# Patient Record
Sex: MALE | Race: WHITE | NOT HISPANIC OR LATINO | Employment: FULL TIME | URBAN - METROPOLITAN AREA
[De-identification: names, ages, dates, MRNs, and addresses within clinical notes are randomized per-mention and may not be internally consistent; named-entity substitution may affect disease eponyms.]

---

## 2022-12-07 ENCOUNTER — HOSPITAL ENCOUNTER (EMERGENCY)
Facility: HOSPITAL | Age: 32
Discharge: HOME | End: 2022-12-07
Attending: INTERNAL MEDICINE

## 2022-12-07 VITALS
DIASTOLIC BLOOD PRESSURE: 86 MMHG | SYSTOLIC BLOOD PRESSURE: 129 MMHG | WEIGHT: 291.69 LBS | OXYGEN SATURATION: 98 % | HEART RATE: 78 BPM | RESPIRATION RATE: 14 BRPM | HEIGHT: 72 IN | BODY MASS INDEX: 39.51 KG/M2 | TEMPERATURE: 98 F

## 2022-12-07 DIAGNOSIS — K04.7 DENTAL ABSCESS: Primary | ICD-10-CM

## 2022-12-07 LAB — POCT GLUCOSE: 243 MG/DL (ref 70–110)

## 2022-12-07 PROCEDURE — 99284 EMERGENCY DEPT VISIT MOD MDM: CPT

## 2022-12-07 PROCEDURE — 25000003 PHARM REV CODE 250: Performed by: PHYSICIAN ASSISTANT

## 2022-12-07 PROCEDURE — 82962 GLUCOSE BLOOD TEST: CPT

## 2022-12-07 RX ORDER — CHLORHEXIDINE GLUCONATE ORAL RINSE 1.2 MG/ML
15 SOLUTION DENTAL 2 TIMES DAILY
Qty: 210 ML | Refills: 0 | Status: SHIPPED | OUTPATIENT
Start: 2022-12-07 | End: 2022-12-14

## 2022-12-07 RX ORDER — IBUPROFEN 800 MG/1
800 TABLET ORAL EVERY 8 HOURS PRN
Qty: 15 TABLET | Refills: 0 | Status: SHIPPED | OUTPATIENT
Start: 2022-12-07

## 2022-12-07 RX ORDER — KETOROLAC TROMETHAMINE 10 MG/1
10 TABLET, FILM COATED ORAL
Status: COMPLETED | OUTPATIENT
Start: 2022-12-07 | End: 2022-12-07

## 2022-12-07 RX ORDER — AMOXICILLIN AND CLAVULANATE POTASSIUM 875; 125 MG/1; MG/1
1 TABLET, FILM COATED ORAL 2 TIMES DAILY
Qty: 20 TABLET | Refills: 0 | Status: SHIPPED | OUTPATIENT
Start: 2022-12-07 | End: 2022-12-17

## 2022-12-07 RX ADMIN — KETOROLAC TROMETHAMINE 10 MG: 10 TABLET, FILM COATED ORAL at 11:12

## 2022-12-07 NOTE — ED PROVIDER NOTES
"Encounter Date: 12/7/2022       History     Chief Complaint   Patient presents with    Oral Swelling     Swelling to right lower gums x 4 days.  Hx of "bad tooth" to same side 1-2 months ago.  Patient also states he has been checking his blood glucose at home and it has been between 200-400.      Patient with no known pmhx presents today c/o right lower toothache and gum swelling for 4 days. He is concerned he has a dental abscess. No exacerbating or relieving factors. This has happened to him before in the past. Patient also mentions that he has been monitoring his glucose at home because he has a strong family history and suspects he may have it as well. He says his glucose has been in the 200-350 range. Patient says he is in the process of trying to get medicaid and/or private insurance. Once he does that he will get set up with a pcp for evaluation of diabetes mellitus. Denies fever, chills, nausea, vomiting, weakness, abdominal pain, vision changes, ams/confusion, chest pain, sob.     The history is provided by the patient. No  was used.   Review of patient's allergies indicates:  No Known Allergies  History reviewed. No pertinent past medical history.  History reviewed. No pertinent surgical history.  History reviewed. No pertinent family history.     Review of Systems   Constitutional:  Negative for chills and fever.   HENT:  Positive for dental problem.    Respiratory:  Negative for cough, chest tightness and shortness of breath.    Cardiovascular:  Negative for chest pain, palpitations and leg swelling.   Gastrointestinal:  Negative for abdominal pain, constipation, diarrhea, nausea and vomiting.   Endocrine:        Hyperglycemia    Genitourinary:  Negative for dysuria, flank pain and hematuria.   Musculoskeletal:  Negative for arthralgias and myalgias.   Skin:  Negative for rash.   Neurological:  Negative for syncope, light-headedness and headaches.   All other systems reviewed and are " negative.    Physical Exam     Initial Vitals [12/07/22 0937]   BP Pulse Resp Temp SpO2   136/87 75 18 98 °F (36.7 °C) 99 %      MAP       --         Physical Exam    Vitals reviewed.  Constitutional: He appears well-developed and well-nourished. He is not diaphoretic. No distress.   HENT:   Head: Normocephalic and atraumatic.   Mouth/Throat: Oropharynx is clear and moist. No oropharyngeal exudate.   Right lower gums with area of fullness adjacent to right lower molars. There is a small amount of purulent drainage from this area and I am able to manually express more when pressure is applied. This area is tender to palpation. No trismus. No facial swelling.    Eyes: Conjunctivae and EOM are normal.   Neck: Neck supple.   Normal range of motion.  Cardiovascular:  Normal rate, regular rhythm, normal heart sounds and intact distal pulses.           Pulmonary/Chest: Breath sounds normal. No respiratory distress.   Abdominal: Abdomen is soft. Bowel sounds are normal. He exhibits no distension. There is no abdominal tenderness. There is no rebound and no guarding.   Musculoskeletal:         General: No edema.      Cervical back: Normal range of motion and neck supple.     Neurological: He is alert and oriented to person, place, and time. GCS score is 15. GCS eye subscore is 4. GCS verbal subscore is 5. GCS motor subscore is 6.   Skin: Skin is warm and dry. Capillary refill takes less than 2 seconds.   Psychiatric: He has a normal mood and affect.       ED Course   Procedures  Labs Reviewed   POCT GLUCOSE - Abnormal; Notable for the following components:       Result Value    POCT Glucose 243 (*)     All other components within normal limits   POCT GLUCOSE, HAND-HELD DEVICE          Imaging Results    None          Medications   ketorolac tablet 10 mg (10 mg Oral Given 12/7/22 1113)                 ED Course as of 12/07/22 1156   Wed Dec 07, 2022   1130 POC glucose today is 240. Recommended labs and rx for metformin if  labs wnl since he will likely will not be able to get in with a pcp quickly. Patient declined; he says he does not want to do any labs today because he wants to wait until he has some sort of health coverage. He also says he was told by someone to not take metformin because it can cause prostate cancer. Recommended patient follow a strict diet. Referral sent to medicine clinic for follow up. Also advised him to f/u with a dentist. ED precautions given.  [SA]      ED Course User Index  [SA] ESTELITA Morrow                 Clinical Impression:   Final diagnoses:  [K04.7] Dental abscess (Primary)      ED Disposition Condition    Discharge Stable          ED Prescriptions       Medication Sig Dispense Start Date End Date Auth. Provider    amoxicillin-clavulanate 875-125mg (AUGMENTIN) 875-125 mg per tablet Take 1 tablet by mouth 2 (two) times daily. for 10 days 20 tablet 12/7/2022 12/17/2022 ESTELITA Morrow    chlorhexidine (PERIDEX) 0.12 % solution Use as directed 15 mLs in the mouth or throat 2 (two) times daily. Swish and spit for 30 seconds BID for 7 days. for 7 days 210 mL 12/7/2022 12/14/2022 ESTELITA Morrow    ibuprofen (ADVIL,MOTRIN) 800 MG tablet Take 1 tablet (800 mg total) by mouth every 8 (eight) hours as needed for Pain. 15 tablet 12/7/2022 -- ESTELITA Morrow          Follow-up Information       Follow up With Specialties Details Why Contact Info Additional Information    [facility] Emergency Medicine  If symptoms worsen return to ED immediately [address] [facility] [address] [phone]     [facility] Internal [facility] Internal Medicine In 2 days  [address] [facility] [address] [phone] Internal Medicine Clinic Entrance #1    Dentist of choice within 1-2 days  Schedule an appointment as soon as possible for a visit                 ESTELITA Morrow  12/07/22 2051

## 2023-04-18 ENCOUNTER — OFFICE VISIT (OUTPATIENT)
Dept: INTERNAL MEDICINE | Facility: CLINIC | Age: 33
End: 2023-04-18
Payer: COMMERCIAL

## 2023-04-18 VITALS
BODY MASS INDEX: 38.03 KG/M2 | HEART RATE: 84 BPM | HEIGHT: 72 IN | TEMPERATURE: 98 F | SYSTOLIC BLOOD PRESSURE: 124 MMHG | DIASTOLIC BLOOD PRESSURE: 79 MMHG | OXYGEN SATURATION: 98 % | WEIGHT: 280.81 LBS | RESPIRATION RATE: 19 BRPM

## 2023-04-18 DIAGNOSIS — Z23 NEED FOR PNEUMOCOCCAL VACCINATION: ICD-10-CM

## 2023-04-18 DIAGNOSIS — E11.9 TYPE 2 DIABETES MELLITUS WITHOUT COMPLICATION, WITHOUT LONG-TERM CURRENT USE OF INSULIN: Primary | ICD-10-CM

## 2023-04-18 DIAGNOSIS — E66.9 CLASS II OBESITY: Chronic | ICD-10-CM

## 2023-04-18 DIAGNOSIS — Z00.00 HEALTHCARE MAINTENANCE: ICD-10-CM

## 2023-04-18 LAB — HBA1C MFR BLD: 11.1 %

## 2023-04-18 PROCEDURE — 83036 HEMOGLOBIN GLYCOSYLATED A1C: CPT | Mod: PBBFAC

## 2023-04-18 PROCEDURE — 99214 OFFICE O/P EST MOD 30 MIN: CPT | Mod: PBBFAC

## 2023-04-18 RX ORDER — SEMAGLUTIDE 1.34 MG/ML
1 INJECTION, SOLUTION SUBCUTANEOUS
Qty: 3 ML | Refills: 11 | Status: CANCELLED | OUTPATIENT
Start: 2023-06-06 | End: 2024-06-05

## 2023-04-18 RX ORDER — SEMAGLUTIDE 0.68 MG/ML
0.25 INJECTION, SOLUTION SUBCUTANEOUS
Qty: 1.5 ML | Refills: 0 | Status: CANCELLED | OUTPATIENT
Start: 2023-04-18 | End: 2023-05-16

## 2023-04-18 RX ORDER — METFORMIN HYDROCHLORIDE 500 MG/1
1000 TABLET, EXTENDED RELEASE ORAL 2 TIMES DAILY WITH MEALS
Qty: 360 TABLET | Refills: 3 | Status: SHIPPED | OUTPATIENT
Start: 2023-04-18 | End: 2024-04-17

## 2023-04-18 RX ORDER — LANCETS
EACH MISCELLANEOUS
Qty: 100 EACH | Refills: 11 | Status: SHIPPED | OUTPATIENT
Start: 2023-04-18

## 2023-04-18 RX ORDER — INSULIN PUMP SYRINGE, 3 ML
EACH MISCELLANEOUS
Qty: 1 EACH | Refills: 0 | Status: SHIPPED | OUTPATIENT
Start: 2023-04-18

## 2023-04-18 RX ORDER — SEMAGLUTIDE 0.68 MG/ML
0.5 INJECTION, SOLUTION SUBCUTANEOUS
Qty: 3 ML | Refills: 0 | Status: CANCELLED | OUTPATIENT
Start: 2023-05-09 | End: 2023-06-06

## 2023-04-18 RX ORDER — ATORVASTATIN CALCIUM 20 MG/1
20 TABLET, FILM COATED ORAL DAILY
Qty: 90 TABLET | Refills: 3 | Status: SHIPPED | OUTPATIENT
Start: 2023-04-18 | End: 2024-04-17

## 2023-04-18 RX ORDER — ORAL SEMAGLUTIDE 3 MG/1
3 TABLET ORAL DAILY
Qty: 30 TABLET | Refills: 0 | Status: SHIPPED | OUTPATIENT
Start: 2023-04-18

## 2023-04-18 RX ORDER — ORAL SEMAGLUTIDE 7 MG/1
7 TABLET ORAL DAILY
Qty: 30 TABLET | Refills: 0 | Status: SHIPPED | OUTPATIENT
Start: 2023-05-12

## 2023-04-18 NOTE — PROGRESS NOTES
[facility] Internal Medicine Clinic Visit    Chief Complaint:      Establish Care (Pt new to clinic here today to established care. Pt requesting to be tested for Diabetes. )     Subjective:     HPI:  Donavan Chew is a 32 y.o. male with a PMH of class II obesity, hyperglycemia is presenting today to clinic to establish care. Pt is concerned he has diabetes, states he has been checking his blood sugars recently and that they usually are in 240-280 range, sometimes > 500 depending on what he eats. He reports fatigue, polydipsia, blurred vision at times, Has history of class III obesity, previously about 450 lbs and lost weight exercising, now at 280 lbs. ED visit did have glucose of 243 on 12/7/22.       Review of Systems  Constitutional: no fever, fatigue, weakness  Eye: no vision loss, eye redness, drainage, or pain  ENMT: no sore throat, ear pain, sinus pain/congestion, nasal congestion/drainage  Respiratory: no cough, no wheezing, no shortness of breath  Cardiovascular: no chest pain, no palpitations, no edema  Gastrointestinal: no nausea, vomiting, or diarrhea. No abdominal pain  Genitourinary: no dysuria, no urinary frequency or urgency, no hematuria  Hema/Lymph: no abnormal bruising or bleeding  Endocrine: no heat or cold intolerance, no excessive thirst or excessive urination  Musculoskeletal: no muscle or joint pain, no joint swelling  Integumentary: no skin rash or abnormal lesion  Neurologic: no headache, no dizziness, no weakness or numbness    Objective:   Last 24 Hour Vital Signs:  Vitals  BP: 124/79  Temp: 97.7 °F (36.5 °C)  Temp Source: Oral  Pulse: 84  Resp: 19  SpO2: 98 %  Height: 6' (182.9 cm)  Weight: 127.4 kg (280 lb 12.8 oz)    Physical Examination:    General: Well nourished, well developed in NAD  HEENT: PERRLA, NC/AT, MMM  Respiratory: CTAB, normal respiratory effort  Cardiovascular: RRR, no m/r/g  Gastrointestinal: S, NT, ND, active BS, no masses palpable  Musculoskeletal: full range of motion of  all extremities/spine without limitation or discomfort  Integumentary: no rashes or skin lesions present  Neurologic: AAOx4, CN II-XII grossly intact, normal gait  Diabetic foot exam:   Left: Reflexes 2+    Vibratory sensation normal   Proprioception normal   Sharp/dull discrimination normal   Filament test present  Right: Reflexes 2+    Vibratory sensation normal   Proprioception normal   Sharp/dull discrimination normal   Filament test present      Labs  BMP: No results found for: CHLORIDE, CO2, BUN, CREATININE, GLUCOSE, CALCIUM  CBC: No results found for: WBC, HGB, HCT, MCV, RDW  LFTs: No results found for: LABPROT, ALBUMIN, BILIRUBINTOT, AST, ALT, ALKPHOS, GGT  FLP: No results found for: CHOL, HDL, LDL, TRIG, TOTALCHOLEST  DM: No results found for: HGBA1C, EAG, GLUF, MICROALBUR, LDLCALC, CREATININE, CREATRANDUR, PROTEINURINE  Thyroid: No results found for: TSH, [id number], [id number], D0ZUXFE, THYROIDAB  Anemia: No results found for: IRON, TIBC, FERRITIN, [id number], FOLATE          Assessment & Plan:     DM, likely Type II  - Random glucose of 243 on 12/7/22 during ED visit  - A1c: POC today was , repeat ordered for 3 months  - Medications: will start metformin 500 mg BID, Ozempic 0.25 mg weekly x4 weeks, then 0.5 mg weekly x4 weeks, then 1 mg weekly until follow up appt  - Ace inhibitor: start lisinopril 5 mg daily  - BP: at goal, < 140/80  - Urine/Protein: ordered  - DM Foot: UTD 4/18/23, see PE above; repeat annually  - DM Eye: ordered  - MIRYAM ordered to differentiate type I vs II     Class III Obesity  - BMI: 38  - Previously 450 lbs  - encouraged to exercise daily  - TSH ordered, BP WNL so no concern for Cushings    Health Maintenance  -Vaccines:    -Pneumonia: Prevnar 20 in 1 month at next visit    -Tdap: unsure, pt to attempt to find record    -Flu: UTD    -Shingles: n/a    -Covid: UTD  -Colon Cancer Screen: n/a, age < 45  -Lung Cancer Screen: n/a, age < 50  -Prostate Cancer Screen: n/a, age < 40  -AAA  screen: n/a, age < 65  -HCV lifetime screen: ordered  -HIV lifetime screen: ordered      To be addressed at next follow-up  -f/u labs      Follow-ups  -Follow-up medicine clinic in 1 month for acute care slot, then 3 months      Oscar Garcia MD  [employer] IM PGY II

## 2023-04-20 ENCOUNTER — LAB VISIT (OUTPATIENT)
Dept: LAB | Facility: HOSPITAL | Age: 33
End: 2023-04-20
Attending: STUDENT IN AN ORGANIZED HEALTH CARE EDUCATION/TRAINING PROGRAM
Payer: COMMERCIAL

## 2023-04-20 DIAGNOSIS — Z00.00 HEALTHCARE MAINTENANCE: ICD-10-CM

## 2023-04-20 DIAGNOSIS — E66.9 CLASS II OBESITY: Chronic | ICD-10-CM

## 2023-04-20 DIAGNOSIS — E11.9 TYPE 2 DIABETES MELLITUS WITHOUT COMPLICATION, WITHOUT LONG-TERM CURRENT USE OF INSULIN: ICD-10-CM

## 2023-04-20 LAB
ALBUMIN SERPL-MCNC: 3 G/DL (ref 3.5–5)
ALBUMIN/GLOB SERPL: 0.6 RATIO (ref 1.1–2)
ALP SERPL-CCNC: 72 UNIT/L (ref 40–150)
ALT SERPL-CCNC: 33 UNIT/L (ref 0–55)
AST SERPL-CCNC: 22 UNIT/L (ref 5–34)
BASOPHILS # BLD AUTO: 0.07 X10(3)/MCL (ref 0–0.2)
BASOPHILS NFR BLD AUTO: 0.6 %
BILIRUBIN DIRECT+TOT PNL SERPL-MCNC: 0.4 MG/DL
BUN SERPL-MCNC: 12.3 MG/DL (ref 8.9–20.6)
CALCIUM SERPL-MCNC: 9.1 MG/DL (ref 8.4–10.2)
CHLORIDE SERPL-SCNC: 101 MMOL/L (ref 98–107)
CHOLEST SERPL-MCNC: 210 MG/DL
CHOLEST/HDLC SERPL: 7 {RATIO} (ref 0–5)
CO2 SERPL-SCNC: 26 MMOL/L (ref 22–29)
CREAT SERPL-MCNC: 0.71 MG/DL (ref 0.73–1.18)
EOSINOPHIL # BLD AUTO: 0.23 X10(3)/MCL (ref 0–0.9)
EOSINOPHIL NFR BLD AUTO: 2.1 %
ERYTHROCYTE [DISTWIDTH] IN BLOOD BY AUTOMATED COUNT: 12.8 % (ref 11.5–17)
GFR SERPLBLD CREATININE-BSD FMLA CKD-EPI: >60 MLS/MIN/1.73/M2
GLOBULIN SER-MCNC: 5 GM/DL (ref 2.4–3.5)
GLUCOSE SERPL-MCNC: 233 MG/DL (ref 74–100)
HCT VFR BLD AUTO: 47 % (ref 42–52)
HCV AB SERPL QL IA: NONREACTIVE
HDLC SERPL-MCNC: 29 MG/DL (ref 35–60)
HGB BLD-MCNC: 15.7 G/DL (ref 14–18)
HIV 1+2 AB+HIV1 P24 AG SERPL QL IA: NONREACTIVE
IMM GRANULOCYTES # BLD AUTO: 0.03 X10(3)/MCL (ref 0–0.04)
IMM GRANULOCYTES NFR BLD AUTO: 0.3 %
LDLC SERPL CALC-MCNC: 124 MG/DL (ref 50–140)
LYMPHOCYTES # BLD AUTO: 3.96 X10(3)/MCL (ref 0.6–4.6)
LYMPHOCYTES NFR BLD AUTO: 36.6 %
MCH RBC QN AUTO: 27.9 PG (ref 27–31)
MCHC RBC AUTO-ENTMCNC: 33.4 G/DL (ref 33–36)
MCV RBC AUTO: 83.5 FL (ref 80–94)
MONOCYTES # BLD AUTO: 0.69 X10(3)/MCL (ref 0.1–1.3)
MONOCYTES NFR BLD AUTO: 6.4 %
NEUTROPHILS # BLD AUTO: 5.85 X10(3)/MCL (ref 2.1–9.2)
NEUTROPHILS NFR BLD AUTO: 54 %
NRBC BLD AUTO-RTO: 0 %
PLATELET # BLD AUTO: 363 X10(3)/MCL (ref 130–400)
PMV BLD AUTO: 9.6 FL (ref 7.4–10.4)
POTASSIUM SERPL-SCNC: 4.1 MMOL/L (ref 3.5–5.1)
PROT SERPL-MCNC: 8 GM/DL (ref 6.4–8.3)
RBC # BLD AUTO: 5.63 X10(6)/MCL (ref 4.7–6.1)
SODIUM SERPL-SCNC: 134 MMOL/L (ref 136–145)
TRIGL SERPL-MCNC: 287 MG/DL (ref 34–140)
TSH SERPL-ACNC: 1.44 UIU/ML (ref 0.35–4.94)
VLDLC SERPL CALC-MCNC: 57 MG/DL
WBC # SPEC AUTO: 10.8 X10(3)/MCL (ref 4.5–11.5)

## 2023-04-20 PROCEDURE — 80061 LIPID PANEL: CPT

## 2023-04-20 PROCEDURE — 84443 ASSAY THYROID STIM HORMONE: CPT

## 2023-04-20 PROCEDURE — 86341 ISLET CELL ANTIBODY: CPT | Mod: 90

## 2023-04-20 PROCEDURE — 85025 COMPLETE CBC W/AUTO DIFF WBC: CPT

## 2023-04-20 PROCEDURE — 36415 COLL VENOUS BLD VENIPUNCTURE: CPT

## 2023-04-20 PROCEDURE — 87389 HIV-1 AG W/HIV-1&-2 AB AG IA: CPT

## 2023-04-20 PROCEDURE — 86803 HEPATITIS C AB TEST: CPT

## 2023-04-20 PROCEDURE — 80053 COMPREHEN METABOLIC PANEL: CPT

## 2023-04-26 LAB — GAD65 AB SER-SCNC: 0 NMOL/L

## 2023-07-24 PROBLEM — Z00.00 HEALTHCARE MAINTENANCE: Status: RESOLVED | Noted: 2023-04-18 | Resolved: 2023-07-24

## 2024-02-02 ENCOUNTER — TELEPHONE (OUTPATIENT)
Dept: INTERNAL MEDICINE | Facility: CLINIC | Age: 34
End: 2024-02-02
Payer: COMMERCIAL

## 2024-02-02 NOTE — TELEPHONE ENCOUNTER
Spoke to pt after confirmed his   and let me know he do not wanted see a PCP at this clinic and hang up